# Patient Record
Sex: FEMALE | Race: WHITE
[De-identification: names, ages, dates, MRNs, and addresses within clinical notes are randomized per-mention and may not be internally consistent; named-entity substitution may affect disease eponyms.]

---

## 2017-03-22 ENCOUNTER — HOSPITAL ENCOUNTER (INPATIENT)
Dept: HOSPITAL 26 - MED | Age: 32
LOS: 2 days | Discharge: HOME | DRG: 517 | End: 2017-03-24
Attending: FAMILY MEDICINE | Admitting: FAMILY MEDICINE
Payer: MEDICAID

## 2017-03-22 VITALS — BODY MASS INDEX: 30.23 KG/M2 | HEIGHT: 60 IN | WEIGHT: 154 LBS

## 2017-03-22 VITALS — SYSTOLIC BLOOD PRESSURE: 134 MMHG | DIASTOLIC BLOOD PRESSURE: 95 MMHG

## 2017-03-22 DIAGNOSIS — F43.9: ICD-10-CM

## 2017-03-22 DIAGNOSIS — D62: ICD-10-CM

## 2017-03-22 DIAGNOSIS — D72.829: ICD-10-CM

## 2017-03-22 DIAGNOSIS — N17.0: ICD-10-CM

## 2017-03-22 DIAGNOSIS — E66.9: ICD-10-CM

## 2017-03-22 DIAGNOSIS — E44.0: ICD-10-CM

## 2017-03-22 DIAGNOSIS — N83.292: ICD-10-CM

## 2017-03-22 DIAGNOSIS — N92.0: ICD-10-CM

## 2017-03-22 DIAGNOSIS — D25.9: ICD-10-CM

## 2017-03-22 DIAGNOSIS — N93.8: Primary | ICD-10-CM

## 2017-03-22 LAB
ALBUMIN FLD-MCNC: 3.3 G/DL (ref 3.4–5)
ALP SERPL-CCNC: 89 U/L (ref 46–116)
ALT SERPL-CCNC: 50 U/L (ref 12–78)
ANION GAP SERPL CALCULATED.3IONS-SCNC: 11.8 MMOL/L (ref 8–16)
APPEARANCE UR: (no result)
APTT PPP: 22 SECS (ref 22–35.6)
AST SERPL-CCNC: 30 U/L (ref 15–37)
BACTERIA URNS QL MICRO: (no result) /HPF
BASOPHILS # BLD AUTO: 0.1 K/UL (ref 0–0.22)
BASOPHILS NFR BLD AUTO: 1 % (ref 0–2)
BILIRUB SERPL-MCNC: 0.3 MG/DL (ref 0–1)
BILIRUB UR QL STRIP: NEGATIVE
BUN SERPL-MCNC: 15 MG/DL (ref 7–18)
CALCIUM SPEC-MCNC: 7.6 MG/DL (ref 8.5–10.1)
CHLORIDE SERPL-SCNC: 105 MMOL/L (ref 98–107)
CO2 SERPL-SCNC: 25 MMOL/L (ref 21–32)
COLOR UR: (no result)
CREAT SERPL-MCNC: 0.7 MG/DL (ref 0.6–1.3)
EOSINOPHIL # BLD AUTO: 0.4 K/UL (ref 0–0.4)
EOSINOPHIL NFR BLD AUTO: 3.5 % (ref 0–4)
ERYTHROCYTE [DISTWIDTH] IN BLOOD BY AUTOMATED COUNT: 22 % (ref 11.6–13.7)
GFR SERPL CREATININE-BSD FRML MDRD: 124 ML/MIN (ref 90–?)
GLUCOSE SERPL-MCNC: 148 MG/DL (ref 74–106)
GLUCOSE UR STRIP-MCNC: NEGATIVE MG/DL
HCT VFR BLD AUTO: 20.1 % (ref 36–48)
HGB BLD-MCNC: 6.8 G/DL (ref 12–16)
HGB UR QL STRIP: (no result)
INR PPP: 1 (ref 0.8–1.2)
LEUKOCYTE ESTERASE UR QL STRIP: NEGATIVE
LYMPHOCYTES # BLD AUTO: 2.4 K/UL (ref 2.5–16.5)
LYMPHOCYTES NFR BLD AUTO: 19.3 % (ref 20.5–51.1)
MCH RBC QN AUTO: 30 PG (ref 27–31)
MCHC RBC AUTO-ENTMCNC: 34 G/DL (ref 33–37)
MCV RBC AUTO: 88 FL (ref 80–94)
MONOCYTES # BLD AUTO: 0.5 K/UL (ref 0.8–1)
MONOCYTES NFR BLD AUTO: 3.7 % (ref 1.7–9.3)
NEUTROPHILS # BLD AUTO: 9 K/UL (ref 1.8–7.7)
NEUTROPHILS NFR BLD AUTO: 72.5 % (ref 42.2–75.2)
NITRITE UR QL STRIP: NEGATIVE
PH UR STRIP: 5.5 [PH] (ref 5–9)
PLATELET # BLD AUTO: 232 K/UL (ref 140–450)
POTASSIUM SERPL-SCNC: 3.8 MMOL/L (ref 3.5–5.1)
PROT SERPL-MCNC: 6.7 G/DL (ref 6.4–8.2)
PROT UR QL STRIP: (no result)
PROTHROMBIN TIME: 9.7 SECS (ref 10.8–13.4)
RBC # BLD AUTO: 2.29 MIL/UL (ref 4.2–5.4)
RBC #/AREA URNS HPF: (no result) /HPF (ref 0–5)
SODIUM SERPL-SCNC: 138 MMOL/L (ref 136–145)
SP GR UR STRIP: 1.02 (ref 1–1.03)
SQUAMOUS URNS QL MICRO: (no result) /LPF
UROBILINOGEN UR STRIP-MCNC: 0.2 EU/DL (ref 0.2–1)
WBC # BLD AUTO: 12.4 K/UL (ref 4.8–10.8)
WBC,URINE: (no result) /HPF (ref 0–5)

## 2017-03-22 PROCEDURE — 30233N1 TRANSFUSION OF NONAUTOLOGOUS RED BLOOD CELLS INTO PERIPHERAL VEIN, PERCUTANEOUS APPROACH: ICD-10-PCS

## 2017-03-22 PROCEDURE — P9016 RBC LEUKOCYTES REDUCED: HCPCS

## 2017-03-22 NOTE — NUR
ADMITTED 31 YEAR OLD FEMALE FROM ER. INITIAL ASSESSMENT COMPLETED. PT AAOX4. PT'S SKIN IS 
INTACT. VS ARE STABLE. PT HAS IV TO RIGHT WRIST 18 G; ASYMPTOMATIC PATENT AND INTACT. PT HAS 
MODERATED VAGINAL BLEEDING.PT DENIES PAIN. ORIENTED PT TO ROOM AND SURROUNDINGS AND USE OF 
CALL LIGHT. EXPLAINED PLAN OF CARE TO PT AND SHE VERBALIZES UNDERSTANDING. FRIEND AT 
BEDSIDE. SAFETY MEASURES IN PLACE, WILL CONTINUE TO MONITOR PT.

## 2017-03-22 NOTE — NUR
-------------------------------------------------------------------------------

            *** Note link in EDM - 03/22/17 at 2236 by [facility] ***             

-------------------------------------------------------------------------------

Patient will be admitted to care of . Admited to TELEMETRY.  Will go to 
[id number]. Belongings list completed.  Report to BILL VALLE.

## 2017-03-22 NOTE — NUR
Patient will be admitted to care of . Admited to TELEMETRY.  Will go to 
[id number]. Belongings list completed.  Report to BILL VALLE.

## 2017-03-22 NOTE — NUR
31Y/F PATIENT PRESENTS TO ED WITH C/O VAGINAL BLEEDIN X 1 MONTH . PT STATES 
HAVING PERIOD FOR 1 MONTH, WENT TO SEE DOCTOR, FOUND OUT PT. HAD ANENIA . 
DENIES N/V/D; SKIN IS PINK/WARM/DRY; AAOX4 WITH EVEN AND STEADY GAIT; LUNGS 
CLEAR BL; HR EVEN AND REGULAR; PT DENIES ANY FEVER, CP, SOB, OR COUGH AT THIS 
TIME; PATIENT STATES PAIN OF 0/10 AT THIS TIME; VSS; PATIENT POSITIONED FOR 
COMFORT; HOB ELEVATED; BEDRAILS UP X2; BED DOWN. ER MD MADE AWARE OF PT STATUS.

## 2017-03-23 VITALS — DIASTOLIC BLOOD PRESSURE: 68 MMHG | SYSTOLIC BLOOD PRESSURE: 111 MMHG

## 2017-03-23 VITALS — DIASTOLIC BLOOD PRESSURE: 68 MMHG | SYSTOLIC BLOOD PRESSURE: 109 MMHG

## 2017-03-23 VITALS — DIASTOLIC BLOOD PRESSURE: 76 MMHG | SYSTOLIC BLOOD PRESSURE: 107 MMHG

## 2017-03-23 VITALS — SYSTOLIC BLOOD PRESSURE: 106 MMHG | DIASTOLIC BLOOD PRESSURE: 60 MMHG

## 2017-03-23 VITALS — SYSTOLIC BLOOD PRESSURE: 111 MMHG | DIASTOLIC BLOOD PRESSURE: 69 MMHG

## 2017-03-23 VITALS — DIASTOLIC BLOOD PRESSURE: 67 MMHG | SYSTOLIC BLOOD PRESSURE: 107 MMHG

## 2017-03-23 LAB
ANION GAP SERPL CALCULATED.3IONS-SCNC: 11.1 MMOL/L (ref 8–16)
BASOPHILS # BLD AUTO: 0 K/UL (ref 0–0.22)
BASOPHILS NFR BLD AUTO: 0.4 % (ref 0–2)
BUN SERPL-MCNC: 13 MG/DL (ref 7–18)
CALCIUM SPEC-MCNC: 7.5 MG/DL (ref 8.5–10.1)
CHLORIDE SERPL-SCNC: 109 MMOL/L (ref 98–107)
CO2 SERPL-SCNC: 23.7 MMOL/L (ref 21–32)
CREAT SERPL-MCNC: 0.7 MG/DL (ref 0.6–1.3)
EOSINOPHIL # BLD AUTO: 0.4 K/UL (ref 0–0.4)
EOSINOPHIL NFR BLD AUTO: 3.6 % (ref 0–4)
ERYTHROCYTE [DISTWIDTH] IN BLOOD BY AUTOMATED COUNT: 17 % (ref 11.6–13.7)
GFR SERPL CREATININE-BSD FRML MDRD: 124 ML/MIN (ref 90–?)
GLUCOSE SERPL-MCNC: 86 MG/DL (ref 74–106)
HCT VFR BLD AUTO: 26.3 % (ref 36–48)
HGB BLD-MCNC: 8.3 G/DL (ref 12–16)
IRON SERPL-MCNC: 22 UG/DL (ref 35–150)
LYMPHOCYTES # BLD AUTO: 2 K/UL (ref 2.5–16.5)
LYMPHOCYTES NFR BLD AUTO: 18.8 % (ref 20.5–51.1)
MAGNESIUM SERPL-MCNC: 2 MG/DL (ref 1.8–2.4)
MCH RBC QN AUTO: 28 PG (ref 27–31)
MCHC RBC AUTO-ENTMCNC: 32 G/DL (ref 33–37)
MCV RBC AUTO: 88 FL (ref 80–94)
MONOCYTES # BLD AUTO: 0.6 K/UL (ref 0.8–1)
MONOCYTES NFR BLD AUTO: 5.8 % (ref 1.7–9.3)
NEUTROPHILS # BLD AUTO: 7.7 K/UL (ref 1.8–7.7)
NEUTROPHILS NFR BLD AUTO: 71.4 % (ref 42.2–75.2)
PHOSPHATE SERPL-MCNC: 3.6 MG/DL (ref 2.5–4.9)
PLATELET # BLD AUTO: 192 K/UL (ref 140–450)
POTASSIUM SERPL-SCNC: 3.8 MMOL/L (ref 3.5–5.1)
RBC # BLD AUTO: 2.99 MIL/UL (ref 4.2–5.4)
SODIUM SERPL-SCNC: 140 MMOL/L (ref 136–145)
TIBC SERPL-MCNC: 275 UG/DL (ref 250–450)
WBC # BLD AUTO: 10.7 K/UL (ref 4.8–10.8)

## 2017-03-23 RX ADMIN — SODIUM CHLORIDE SCH MLS/HR: 9 INJECTION, SOLUTION INTRAVENOUS at 02:02

## 2017-03-23 RX ADMIN — SODIUM CHLORIDE SCH MLS/HR: 9 INJECTION, SOLUTION INTRAVENOUS at 14:00

## 2017-03-23 RX ADMIN — SODIUM CHLORIDE SCH MLS/HR: 9 INJECTION, SOLUTION INTRAVENOUS at 20:34

## 2017-03-23 RX ADMIN — SODIUM CHLORIDE SCH MLS/HR: 9 INJECTION, SOLUTION INTRAVENOUS at 06:21

## 2017-03-23 NOTE — NUR
MEDICATED PT FOR C/O HEADACHE PER MD ORDER. NO SOB, NO SIGNS OF DISTRESS. IV SITE 
ASYMPTOMATIC, INTACT, PATENT, IVF RUNNING. FAMILY AT BEDSIDE. CALL LIGHT WITHIN REACH. 
SAFETY MEASURES IN PLACE. WILL CONTINUE TO MONITOR.

## 2017-03-23 NOTE — NUR
RECEIVED PT AAOX4. NO SOB NOTED. NO C/O PAIN AT THIS TIME. IV TO RT WRIST AND LT FOREARM 
PATENT AND INTACT. CHEST CLEAR. ABDOMEN SOFT, BOWEL SOUNDS PRESENT. PERIPAD APPLIED, TO 
MONITOR FOR VAGINAL BLEEDING. INSTRUCTED PT TO CALL MERLY ASSISTANCE, CALL LIGHT WITHIN 
REACH, PT VERBALIZED UNDERSTANDING.

## 2017-03-23 NOTE — NUR
PATIENT HAS BEEN SCREENED AND CATEGORIZED AS MODERATE NUTRITION RISK. PATIENT WILL BE SEEN 
WITHIN 3-5 DAYS OF ADMISSION.



3/25/17-3/27/17



EBONIE LEBL[address] RD

## 2017-03-23 NOTE — NUR
RECEIVED PT IN STABLE CONDITION FROM [facility]ERS. NO SOB, NO SIGNS OF DISTRESS. VS STABLE ON 
ROOM AIR. PT IS AOX4, AMBULATORY, Guinean SPEAKING. FAMILY IS AT BEDSIDE. SKIN IS INTACT. PT 
STATES VAGINAL BLEEDING HAS STOPPED. IV TO LT FA 22G PATENT, ASYMPTOMATIC, INTACT, IVF 
RUNNING. IV TO RT WRIST 18G PATENT, ASYMPTOMATIC, INTACT, SALINE LOCKED. PT C/O HEADACHE, 
WILL MEDICATE PER MD ORDER. PLAN OF CARE DISCUSSED WITH PT AND FAMILY. SAFETY MEASURES IN 
PLACE. CALL LIGHT WITHIN REACH. WILL CONTINUE TO MONITOR.

## 2017-03-23 NOTE — NUR
PT ASLEEP IN BED. NO SOB, NO SIGNS OF DISTRESS. IV SITE ASYMPTOMATIC, INTACT, PATENT, IVF 
RUNNING. FAMILY AT BEDSIDE. CALL LIGHT WITHIN REACH. SAFETY MEASURES IN PLACE. WILL CONTINUE 
TO MONITOR.

## 2017-03-23 NOTE — NUR
INSTRUCTED PT NPO AFTER MIDNIGHT TONIGHT FOR PLANNED PROCEDURE TOMORROW, PT VERBALIZED 
UNDERSTANDING.

## 2017-03-23 NOTE — NUR
PT AWAKE, TALKING TO FAMILY AT THE BEDSIDE. NO COMPLAINTS MADE. WILL ENDORSE TO NEXT SHIFT 
NURSE FOR CONTINUITY OF CARE.

## 2017-03-23 NOTE — NUR
SECOND UNIT OF BLOOD COMPLETED. PT STABLE, VS WITHIN NORMAL RANGE. PT DENIES DISCOMFORT OR 
PAIN. WILL CONTINUE TO MONITOR PT.

## 2017-03-23 NOTE — NUR
FIRST UNIT OF BLOOD COMPLETED. PT STABLE, VS WITHIN NORMAL. NO REACTIONS TO BLOOD, WILL 
CONTINUE TO MONITOR PT.

## 2017-03-24 VITALS — SYSTOLIC BLOOD PRESSURE: 98 MMHG | DIASTOLIC BLOOD PRESSURE: 62 MMHG

## 2017-03-24 VITALS — SYSTOLIC BLOOD PRESSURE: 109 MMHG | DIASTOLIC BLOOD PRESSURE: 67 MMHG

## 2017-03-24 VITALS — DIASTOLIC BLOOD PRESSURE: 60 MMHG | SYSTOLIC BLOOD PRESSURE: 104 MMHG

## 2017-03-24 VITALS — SYSTOLIC BLOOD PRESSURE: 98 MMHG | DIASTOLIC BLOOD PRESSURE: 63 MMHG

## 2017-03-24 VITALS — DIASTOLIC BLOOD PRESSURE: 64 MMHG | SYSTOLIC BLOOD PRESSURE: 102 MMHG

## 2017-03-24 VITALS — SYSTOLIC BLOOD PRESSURE: 107 MMHG | DIASTOLIC BLOOD PRESSURE: 66 MMHG

## 2017-03-24 VITALS — DIASTOLIC BLOOD PRESSURE: 66 MMHG | SYSTOLIC BLOOD PRESSURE: 100 MMHG

## 2017-03-24 VITALS — SYSTOLIC BLOOD PRESSURE: 107 MMHG | DIASTOLIC BLOOD PRESSURE: 69 MMHG

## 2017-03-24 VITALS — DIASTOLIC BLOOD PRESSURE: 73 MMHG | SYSTOLIC BLOOD PRESSURE: 114 MMHG

## 2017-03-24 VITALS — SYSTOLIC BLOOD PRESSURE: 114 MMHG | DIASTOLIC BLOOD PRESSURE: 69 MMHG

## 2017-03-24 LAB
ALBUMIN FLD-MCNC: 2.7 G/DL (ref 3.4–5)
ANION GAP SERPL CALCULATED.3IONS-SCNC: 11.7 MMOL/L (ref 8–16)
BASOPHILS # BLD AUTO: 0.1 K/UL (ref 0–0.22)
BASOPHILS NFR BLD AUTO: 0.8 % (ref 0–2)
BUN SERPL-MCNC: 12 MG/DL (ref 7–18)
CALCIUM SPEC-MCNC: 7.7 MG/DL (ref 8.5–10.1)
CHLORIDE SERPL-SCNC: 109 MMOL/L (ref 98–107)
CO2 SERPL-SCNC: 23.7 MMOL/L (ref 21–32)
CREAT SERPL-MCNC: 0.7 MG/DL (ref 0.6–1.3)
EOSINOPHIL # BLD AUTO: 0.3 K/UL (ref 0–0.4)
EOSINOPHIL NFR BLD AUTO: 3.3 % (ref 0–4)
ERYTHROCYTE [DISTWIDTH] IN BLOOD BY AUTOMATED COUNT: 17.7 % (ref 11.6–13.7)
FERRITIN SERPL-MCNC: 58 NG/ML (ref 15–150)
FOLATE SERPL-MCNC: 9.6 NG/ML (ref 3–?)
GFR SERPL CREATININE-BSD FRML MDRD: 124 ML/MIN (ref 90–?)
GLUCOSE SERPL-MCNC: 88 MG/DL (ref 74–106)
HCT VFR BLD AUTO: 25.9 % (ref 36–48)
HGB BLD-MCNC: 8.3 G/DL (ref 12–16)
LYMPHOCYTES # BLD AUTO: 2.1 K/UL (ref 2.5–16.5)
LYMPHOCYTES NFR BLD AUTO: 21.2 % (ref 20.5–51.1)
MAGNESIUM SERPL-MCNC: 2 MG/DL (ref 1.8–2.4)
MCH RBC QN AUTO: 28 PG (ref 27–31)
MCHC RBC AUTO-ENTMCNC: 32 G/DL (ref 33–37)
MCV RBC AUTO: 87 FL (ref 80–94)
MONOCYTES # BLD AUTO: 0.8 K/UL (ref 0.8–1)
MONOCYTES NFR BLD AUTO: 7.8 % (ref 1.7–9.3)
NEUTROPHILS # BLD AUTO: 6.6 K/UL (ref 1.8–7.7)
NEUTROPHILS NFR BLD AUTO: 66.9 % (ref 42.2–75.2)
PHOSPHATE SERPL-MCNC: 4.3 MG/DL (ref 2.5–4.9)
PLATELET # BLD AUTO: 205 K/UL (ref 140–450)
POTASSIUM SERPL-SCNC: 4.4 MMOL/L (ref 3.5–5.1)
RBC # BLD AUTO: 2.96 MIL/UL (ref 4.2–5.4)
SODIUM SERPL-SCNC: 140 MMOL/L (ref 136–145)
TRANSFERRIN SERPL-MCNC: 264 MG/DL (ref 200–370)
VIT B12 SERPL-MCNC: 811 PG/ML (ref 211–946)
WBC # BLD AUTO: 9.9 K/UL (ref 4.8–10.8)

## 2017-03-24 PROCEDURE — 0UDB7ZZ EXTRACTION OF ENDOMETRIUM, VIA NATURAL OR ARTIFICIAL OPENING: ICD-10-PCS | Performed by: OBSTETRICS & GYNECOLOGY

## 2017-03-24 RX ADMIN — SODIUM CHLORIDE SCH MLS/HR: 9 INJECTION, SOLUTION INTRAVENOUS at 04:49

## 2017-03-24 NOTE — NUR
PT CAME BACK FROM PACU ON STABLE CONDITION, ALERT ORIENTED X4. DENIES ANY PAIN OR DISCOMFORT 
AT THIS TIME. WILL MONITOR.

## 2017-03-24 NOTE — NUR
VS STABLE ON ROOM AIR. NO SOB, NO SIGNS OF DISTRESS. PT DENIES PAIN AT THIS TIME. IV SITES 
ASYMPTOMATIC, INTACT, PATENT, IVF RUNNING. PLAN OF CARE DISCUSSED WITH PT. SAFETY MEASURES 
IN PLACE. CALL LIGHT WITHIN REACH. WILL CONTINUE TO MONITOR.

## 2017-03-24 NOTE — NUR
PT STATED SHE FELT BETTER, NO COMPLAINTS OF NAUSEA AND VOMITING. DISCHARGE INSTRUCTIONS 
GIVEN TO PT. VERBALIZED UNDERSTANDING. SPOUSE WITH PT. PT SIGNED DISCHARGE PAPERS.

## 2017-03-24 NOTE — NUR
RECEIVED PT AAOX4. NO SOB NOTED. NO C/O PAIN AT THIS TIME. IV TO RT WRIST AND LT FOREARM 
PATENT AND INTACT. CHEST CLEAR. ABDOMEN SOFT, BOWEL SOUNDS PRESENT. PERIPAD APPLIED, TO 
MONITOR FOR VAGINAL BLEEDING. NPO MAINTAINED FOR PLANNED PROCEDURE. CONSENT FOR D&C ALREADY 
SIGNED BY PT. INSTRUCTED PT TO CALL FOR ASSISTANCE, CALL LIGHT WITHIN REACH, PT VERBALIZED 
UNDERSTANDING.

## 2017-03-24 NOTE — NUR
PT [id number] PREVIOUSLY EATEN FOOD. WILL MEDICATE ZOFRAN IVP  AS NEEDED PRN, WILL CONTINUE 
TO MONITOR PT.

## 2017-03-24 NOTE — NUR
PT VOIDED FREELY TO THE BATHROOM, ACTIVITY TOLERATED WELL. SMALL AMOUNT OF VAGINAL BLEEDING 
NOTED ON THE PERIPAD, AND MODERATE AMOUNT OF VAGINAL BLEEDING NOTED WITH URINATION.

## 2017-03-24 NOTE — NUR
PT ASLEEP IN BED. NO SOB, NO SIGNS OF DISTRESS. IV SITES ASYMPTOMATIC, INTACT, PATENT, IVF 
RUNNING. SAFETY MEASURES IN PLACE. CALL LIGHT WITHIN REACH. WILL CONTINUE TO MONITOR.

## 2017-03-24 NOTE — NUR
PT IV REMOVED, CANNULA INTACT, NO BLEEDING NOTED. NO COMPLAINTS OF ABDOMINAL PAIN, NO 
PROFUSE VAGINAL BLEEDING, PT ABLE TO AMBULATE. PT STABLE. WHEELED OUTSIDE BY CNA.

## 2018-10-07 ENCOUNTER — HOSPITAL ENCOUNTER (EMERGENCY)
Dept: HOSPITAL 26 - MED | Age: 33
Discharge: HOME | End: 2018-10-07
Payer: MEDICAID

## 2018-10-07 VITALS — WEIGHT: 140 LBS | HEIGHT: 61 IN | BODY MASS INDEX: 26.43 KG/M2

## 2018-10-07 VITALS — SYSTOLIC BLOOD PRESSURE: 138 MMHG | DIASTOLIC BLOOD PRESSURE: 74 MMHG

## 2018-10-07 VITALS — DIASTOLIC BLOOD PRESSURE: 78 MMHG | SYSTOLIC BLOOD PRESSURE: 140 MMHG
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
## 2018-10-07 DIAGNOSIS — S39.012A: ICD-10-CM
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
## 2018-10-07 DIAGNOSIS — S16.1XXA: Primary | ICD-10-CM
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
## 2018-10-07 PROCEDURE — 72131 CT LUMBAR SPINE W/O DYE: CPT

## 2018-10-07 PROCEDURE — 96372 THER/PROPH/DIAG INJ SC/IM: CPT

## 2018-10-07 PROCEDURE — 72125 CT NECK SPINE W/O DYE: CPT

## 2018-10-07 PROCEDURE — 81025 URINE PREGNANCY TEST: CPT

## 2018-10-07 PROCEDURE — 99284 EMERGENCY DEPT VISIT MOD MDM: CPT

## 2018-10-07 PROCEDURE — 72128 CT CHEST SPINE W/O DYE: CPT
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
## 2018-10-07 NOTE — NUR
Patient discharged with v/s stable. Written and verbal after care instructions 
given and explained. 

Patient alert, oriented and verbalized understanding of instructions. 
Ambulatory with steady gait. All questions addressed prior to discharge. ID 
band removed. Patient advised to follow up with PMD. Rx of IBUPROFEN AND 
FLEXERIL given. Patient educated on indication of medication including possible 
reaction and side effects. Opportunity to ask questions provided and answered.

## 2020-01-05 ENCOUNTER — HOSPITAL ENCOUNTER (EMERGENCY)
Dept: HOSPITAL 26 - MED | Age: 35
Discharge: HOME | End: 2020-01-05
Payer: MEDICAID

## 2020-01-05 VITALS — HEIGHT: 60 IN | WEIGHT: 148.25 LBS | BODY MASS INDEX: 29.11 KG/M2

## 2020-01-05 VITALS — DIASTOLIC BLOOD PRESSURE: 94 MMHG | SYSTOLIC BLOOD PRESSURE: 131 MMHG

## 2020-01-05 VITALS — SYSTOLIC BLOOD PRESSURE: 131 MMHG | DIASTOLIC BLOOD PRESSURE: 94 MMHG

## 2020-01-05 DIAGNOSIS — Z98.890: ICD-10-CM

## 2020-01-05 DIAGNOSIS — N12: Primary | ICD-10-CM

## 2020-01-05 LAB
AMORPH SED URNS QL MICRO: (no result) /HPF
ANION GAP SERPL CALCULATED.3IONS-SCNC: 14.4 MMOL/L (ref 8–16)
APPEARANCE UR: (no result)
BASOPHILS # BLD AUTO: 0 K/UL (ref 0–0.22)
BASOPHILS NFR BLD AUTO: 0.7 % (ref 0–2)
BILIRUB UR QL STRIP: (no result)
BUN SERPL-MCNC: 14 MG/DL (ref 7–18)
CHLORIDE SERPL-SCNC: 104 MMOL/L (ref 98–107)
CO2 SERPL-SCNC: 26.4 MMOL/L (ref 21–32)
COLOR UR: (no result)
CREAT SERPL-MCNC: 0.8 MG/DL (ref 0.6–1.3)
EOSINOPHIL # BLD AUTO: 0.3 K/UL (ref 0–0.4)
EOSINOPHIL NFR BLD AUTO: 3.9 % (ref 0–4)
ERYTHROCYTE [DISTWIDTH] IN BLOOD BY AUTOMATED COUNT: 18.6 % (ref 11.6–13.7)
GFR SERPL CREATININE-BSD FRML MDRD: 104 ML/MIN (ref 90–?)
GLUCOSE SERPL-MCNC: 118 MG/DL (ref 74–106)
GLUCOSE UR STRIP-MCNC: NEGATIVE MG/DL
HCT VFR BLD AUTO: 37.8 % (ref 36–48)
HGB BLD-MCNC: 11.6 G/DL (ref 12–16)
HGB UR QL STRIP: (no result)
LEUKOCYTE ESTERASE UR QL STRIP: (no result)
LYMPHOCYTES # BLD AUTO: 1.5 K/UL (ref 2.5–16.5)
LYMPHOCYTES NFR BLD AUTO: 22.1 % (ref 20.5–51.1)
MCH RBC QN AUTO: 23 PG (ref 27–31)
MCHC RBC AUTO-ENTMCNC: 31 G/DL (ref 33–37)
MCV RBC AUTO: 76.1 FL (ref 80–94)
MONOCYTES # BLD AUTO: 0.4 K/UL (ref 0.8–1)
MONOCYTES NFR BLD AUTO: 5.3 % (ref 1.7–9.3)
NEUTROPHILS # BLD AUTO: 4.5 K/UL (ref 1.8–7.7)
NEUTROPHILS NFR BLD AUTO: 68 % (ref 42.2–75.2)
NITRITE UR QL STRIP: POSITIVE
PH UR STRIP: 6.5 [PH] (ref 5–9)
PLATELET # BLD AUTO: 264 K/UL (ref 140–450)
POTASSIUM SERPL-SCNC: 3.8 MMOL/L (ref 3.5–5.1)
RBC # BLD AUTO: 4.96 MIL/UL (ref 4.2–5.4)
RBC #/AREA URNS HPF: (no result) /HPF (ref 0–5)
SODIUM SERPL-SCNC: 141 MMOL/L (ref 136–145)
WBC # BLD AUTO: 6.6 K/UL (ref 4.8–10.8)
WBC,URINE: (no result) /HPF (ref 0–5)

## 2020-01-05 PROCEDURE — 96372 THER/PROPH/DIAG INJ SC/IM: CPT

## 2020-01-05 PROCEDURE — 81025 URINE PREGNANCY TEST: CPT

## 2020-01-05 PROCEDURE — 36415 COLL VENOUS BLD VENIPUNCTURE: CPT

## 2020-01-05 PROCEDURE — 85025 COMPLETE CBC W/AUTO DIFF WBC: CPT

## 2020-01-05 PROCEDURE — 80048 BASIC METABOLIC PNL TOTAL CA: CPT

## 2020-01-05 PROCEDURE — 99283 EMERGENCY DEPT VISIT LOW MDM: CPT

## 2020-01-05 PROCEDURE — 81001 URINALYSIS AUTO W/SCOPE: CPT

## 2020-01-05 NOTE — NUR
PT STATES SHE IS FEELING LESS PAIN AFTER MEDICATION ADMINISTERED. RESTING 
COMFORTABLY,  AT BEDSIDE.

## 2020-01-05 NOTE — NUR
Patient discharged with v/s stable. Written and verbal after care instructions 
given and explained. 

Patient alert, oriented and verbalized understanding of instructions. 
Ambulatory with steady gait. All questions addressed prior to discharge. ID 
band removed. Patient advised to follow up with PMD. Rx of ZOFRAN, CIPRO, 
MOTRIN, PROVERA given. Patient educated on indication of medication including 
possible reaction and side effects. Opportunity to ask questions provided and 
answered.
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
## 2022-12-17 ENCOUNTER — HOSPITAL ENCOUNTER (EMERGENCY)
Dept: HOSPITAL 26 - MED | Age: 37
LOS: 1 days | Discharge: HOME | End: 2022-12-18
Payer: MEDICAID

## 2022-12-17 VITALS — WEIGHT: 148 LBS | HEIGHT: 62 IN | BODY MASS INDEX: 27.23 KG/M2

## 2022-12-17 VITALS — DIASTOLIC BLOOD PRESSURE: 90 MMHG | SYSTOLIC BLOOD PRESSURE: 126 MMHG

## 2022-12-17 DIAGNOSIS — S09.90XA: Primary | ICD-10-CM

## 2022-12-17 DIAGNOSIS — R55: ICD-10-CM

## 2022-12-17 DIAGNOSIS — D64.9: ICD-10-CM
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
## 2022-12-17 LAB
ALBUMIN FLD-MCNC: 3.3 G/DL (ref 3.4–5)
ANION GAP SERPL CALCULATED.3IONS-SCNC: 11.3 MMOL/L (ref 8–16)
AST SERPL-CCNC: 14 U/L (ref 15–37)
BASOPHILS # BLD AUTO: 0.1 K/UL (ref 0–0.22)
BASOPHILS NFR BLD AUTO: 0.9 % (ref 0–2)
BILIRUB SERPL-MCNC: 0.1 MG/DL (ref 0–1)
BUN SERPL-MCNC: 21 MG/DL (ref 7–18)
CHLORIDE SERPL-SCNC: 106 MMOL/L (ref 98–107)
CO2 SERPL-SCNC: 27.7 MMOL/L (ref 21–32)
CREAT SERPL-MCNC: 0.6 MG/DL (ref 0.6–1.3)
EOSINOPHIL # BLD AUTO: 0.2 K/UL (ref 0–0.4)
EOSINOPHIL NFR BLD AUTO: 3 % (ref 0–4)
ERYTHROCYTE [DISTWIDTH] IN BLOOD BY AUTOMATED COUNT: 19.1 % (ref 11.6–13.7)
GFR SERPL CREATININE-BSD FRML MDRD: 143 ML/MIN (ref 90–?)
GLUCOSE SERPL-MCNC: 103 MG/DL (ref 74–106)
HCT VFR BLD AUTO: 27.8 % (ref 36–48)
HGB BLD-MCNC: 8.5 G/DL (ref 12–16)
LYMPHOCYTES # BLD AUTO: 2.3 K/UL (ref 2.5–16.5)
LYMPHOCYTES NFR BLD AUTO: 29.3 % (ref 20.5–51.1)
MCH RBC QN AUTO: 21 PG (ref 27–31)
MCHC RBC AUTO-ENTMCNC: 31 G/DL (ref 33–37)
MCV RBC AUTO: 67.8 FL (ref 80–94)
MONOCYTES # BLD AUTO: 0.6 K/UL (ref 0.8–1)
MONOCYTES NFR BLD AUTO: 7.1 % (ref 1.7–9.3)
NEUTROPHILS # BLD AUTO: 4.7 K/UL (ref 1.8–7.7)
NEUTROPHILS NFR BLD AUTO: 59.7 % (ref 42.2–75.2)
PLATELET # BLD AUTO: 291 K/UL (ref 140–450)
POTASSIUM SERPL-SCNC: 4 MMOL/L (ref 3.5–5.1)
RBC # BLD AUTO: 4.1 MIL/UL (ref 4.2–5.4)
SODIUM SERPL-SCNC: 141 MMOL/L (ref 136–145)
WBC # BLD AUTO: 7.9 K/UL (ref 4.8–10.8)

## 2022-12-17 PROCEDURE — 99284 EMERGENCY DEPT VISIT MOD MDM: CPT

## 2022-12-17 PROCEDURE — 80053 COMPREHEN METABOLIC PANEL: CPT

## 2022-12-17 PROCEDURE — 36415 COLL VENOUS BLD VENIPUNCTURE: CPT

## 2022-12-17 PROCEDURE — 96372 THER/PROPH/DIAG INJ SC/IM: CPT

## 2022-12-17 PROCEDURE — 93005 ELECTROCARDIOGRAM TRACING: CPT

## 2022-12-17 PROCEDURE — 85025 COMPLETE CBC W/AUTO DIFF WBC: CPT

## 2022-12-17 PROCEDURE — 81025 URINE PREGNANCY TEST: CPT

## 2022-12-18 VITALS — SYSTOLIC BLOOD PRESSURE: 126 MMHG | DIASTOLIC BLOOD PRESSURE: 79 MMHG

## 2022-12-18 NOTE — NUR
Patient discharged with v/s stable. Written and verbal after care instructions 
given and explained. 

Patient verbalized understanding. Ambulatory with steady gait. All questions 
addressed prior to discharge. Advised to follow up with PMD. pt went home with 
her belongings